# Patient Record
Sex: FEMALE | Race: WHITE | HISPANIC OR LATINO | URBAN - METROPOLITAN AREA
[De-identification: names, ages, dates, MRNs, and addresses within clinical notes are randomized per-mention and may not be internally consistent; named-entity substitution may affect disease eponyms.]

---

## 2017-07-21 ENCOUNTER — APPOINTMENT (RX ONLY)
Dept: URBAN - METROPOLITAN AREA CLINIC 156 | Facility: CLINIC | Age: 27
Setting detail: DERMATOLOGY
End: 2017-07-21

## 2017-07-21 DIAGNOSIS — Z41.9 ENCOUNTER FOR PROCEDURE FOR PURPOSES OTHER THAN REMEDYING HEALTH STATE, UNSPECIFIED: ICD-10-CM

## 2017-07-21 PROCEDURE — ? COSMETIC CONSULTATION: SKIN CARE PRODUCTS AND SERVICES

## 2017-08-07 ENCOUNTER — APPOINTMENT (RX ONLY)
Dept: URBAN - METROPOLITAN AREA CLINIC 156 | Facility: CLINIC | Age: 27
Setting detail: DERMATOLOGY
End: 2017-08-07

## 2017-08-07 DIAGNOSIS — Z41.9 ENCOUNTER FOR PROCEDURE FOR PURPOSES OTHER THAN REMEDYING HEALTH STATE, UNSPECIFIED: ICD-10-CM

## 2017-08-07 PROCEDURE — ? COSMETIC CONSULTATION: LASER RESURFACING

## 2017-08-07 PROCEDURE — ? COSMETIC CONSULTATION: SKIN CARE PRODUCTS AND SERVICES

## 2017-08-07 PROCEDURE — ? COSMETIC CONSULTATION - MICRO-NEEDLING

## 2017-08-07 PROCEDURE — ? COSMETIC CONSULTATION: SKIN TIGHTENING

## 2017-08-07 PROCEDURE — ? COSMETIC CONSULTATION: PALOMAR LASER

## 2017-08-10 ENCOUNTER — RX ONLY (OUTPATIENT)
Age: 27
Setting detail: RX ONLY
End: 2017-08-10

## 2017-08-10 RX ORDER — MINOCYCLINE HYDROCHLORIDE 100 MG/1
TABLET ORAL
Qty: 28 | Refills: 0 | Status: CANCELLED
Stop reason: ENTERED-IN-ERROR

## 2017-08-10 RX ORDER — ACYCLOVIR 400 MG/1
TABLET ORAL
Qty: 14 | Refills: 0 | Status: CANCELLED
Stop reason: ENTERED-IN-ERROR

## 2017-08-16 ENCOUNTER — RX ONLY (OUTPATIENT)
Age: 27
Setting detail: RX ONLY
End: 2017-08-16

## 2017-08-16 RX ORDER — CLINDAMYCIN PHOSPHATE AND TRETINOIN 12; .25 MG/G; MG/G
GEL TOPICAL
Qty: 1 | Refills: 6 | Status: ERX | COMMUNITY
Start: 2017-08-16

## 2017-08-16 RX ORDER — DAPSONE 75 MG/G
GEL TOPICAL
Qty: 1 | Refills: 4 | Status: ERX | COMMUNITY
Start: 2017-08-16

## 2017-10-19 ENCOUNTER — RX ONLY (OUTPATIENT)
Age: 27
Setting detail: RX ONLY
End: 2017-10-19

## 2017-10-19 RX ORDER — HYDROQUINONE 4 %
CREAM (GRAM) TOPICAL
Qty: 1 | Refills: 0 | Status: ERX | COMMUNITY
Start: 2017-10-19

## 2017-10-19 RX ORDER — MINOCYCLINE HYDROCHLORIDE 100 MG/1
TABLET ORAL
Qty: 28 | Refills: 0 | Status: ERX | COMMUNITY
Start: 2017-10-19

## 2017-10-19 RX ORDER — ACYCLOVIR 400 MG/1
TABLET ORAL
Qty: 14 | Refills: 0 | Status: ERX | COMMUNITY
Start: 2017-10-19

## 2017-11-03 ENCOUNTER — RX ONLY (OUTPATIENT)
Age: 27
Setting detail: RX ONLY
End: 2017-11-03

## 2017-11-03 ENCOUNTER — APPOINTMENT (RX ONLY)
Dept: URBAN - METROPOLITAN AREA CLINIC 156 | Facility: CLINIC | Age: 27
Setting detail: DERMATOLOGY
End: 2017-11-03

## 2017-11-03 DIAGNOSIS — Z41.9 ENCOUNTER FOR PROCEDURE FOR PURPOSES OTHER THAN REMEDYING HEALTH STATE, UNSPECIFIED: ICD-10-CM

## 2017-11-03 PROCEDURE — ? COSMETIC CONSULTATION: SKIN CARE PRODUCTS AND SERVICES

## 2017-11-03 RX ORDER — MINOCYCLINE HYDROCHLORIDE 100 MG/1
1 CAPSULE ORAL BID
Qty: 60 | Refills: 0 | Status: ERX | COMMUNITY
Start: 2017-11-03

## 2017-11-03 RX ORDER — NORGESTIMATE AND ETHINYL ESTRADIOL 7DAYSX3 LO
1 KIT ORAL DAILY
Qty: 1 | Refills: 12 | Status: ERX | COMMUNITY
Start: 2017-11-03

## 2017-11-17 ENCOUNTER — APPOINTMENT (RX ONLY)
Dept: URBAN - METROPOLITAN AREA CLINIC 156 | Facility: CLINIC | Age: 27
Setting detail: DERMATOLOGY
End: 2017-11-17

## 2017-11-17 DIAGNOSIS — Z41.9 ENCOUNTER FOR PROCEDURE FOR PURPOSES OTHER THAN REMEDYING HEALTH STATE, UNSPECIFIED: ICD-10-CM

## 2017-11-17 PROCEDURE — ? FRACTIONAL ERBIUM ABLATIVE LASER

## 2017-11-17 PROCEDURE — ? FRAXEL

## 2017-11-17 ASSESSMENT — LOCATION SIMPLE DESCRIPTION DERM
LOCATION SIMPLE: LEFT CHEEK
LOCATION SIMPLE: RIGHT CHEEK

## 2017-11-17 ASSESSMENT — LOCATION DETAILED DESCRIPTION DERM
LOCATION DETAILED: LEFT INFERIOR CENTRAL MALAR CHEEK
LOCATION DETAILED: RIGHT INFERIOR CENTRAL MALAR CHEEK

## 2017-11-17 ASSESSMENT — LOCATION ZONE DERM: LOCATION ZONE: FACE

## 2017-11-17 NOTE — PROCEDURE: FRAXEL
Post-Care Instructions: Treatment area washed with gentle cleanser after treatment.  SPF 30 applied post treatment.  Cool gel pack provided to patient. \\nI reviewed with the patient in detail post-care instructions. Patient should avoid sun until area fully healed.
Treatment Level: 0
Energy(Mj/Cm2): 70
Treatment Number: 1
Length Of Topical Anesthesia Application (Optional): 90 minutes
Add Post-Care Below To The Note: No
External Cooling Fan Speed: 5
Indication: resurfacing
Location: cheeks
Topical Anesthesia Type: 23% Lidocaine 7% Tetracaine
Treatment Level: 3
Consent: Written consent obtained, risks reviewed including but not limited to pain and incomplete improvement.\\n\\nPatient has not used any retinoids or glycolic acid in last 2 weeks, has not used a depilatory cream or wad in 2 weeks, has not used Accutane in last 6 months.  \\n\\nLaser Check pre-procedure:\\nOptical window on laser hand piece clean with no scratches, burns, pits or debris.\\nOptical window on treatment tip clean with no scratches, pits, or debris.
Number Of Passes: 12
Wavelength: 1550nm
External Cooling: Lorna Cryo 5
Detail Level: Simple

## 2017-11-17 NOTE — PROCEDURE: FRACTIONAL ERBIUM ABLATIVE LASER
Spot Size: blue tip
Spot Size: groove tip
Treatment Site: cheeks, temple
External Cooling Fan Speed: 5
Post-Procedure Text: Following the treatment, ice, aquaphor, and sunblock was applied to the treatment areas.  Post-care instructions were discussed.
Consent: Written consent obtained, risks reviewed including but not limited to crusting, scabbing, blistering, scarring, darker or lighter pigmentary change, and/or incomplete removal.
Treatment Site: Cheeks
Detail Level: Zone
Procedural Text: The treatment areas where then treated as noted above.
Post-Care Instructions: I reviewed with the patient in detail post-care instructions. Patient is to apply vaseline with a q-tip to all crusted areas, and avoid picking at any scabs. Pt should stay away from the sun and wear sun protection until fully healed.
Energy In Mj/Cm2: 5/0/0 - 5.5/3/5.5
Number Of Passes: 4
Energy In Mj/Cm2: 9/0/0 9/3/5
Pre-Procedure Text: After consent was obtained and the procedure was explained, all persons present in the exam room put on their protective eyewear. Cold gel was applied to the treatment areas.

## 2017-12-22 ENCOUNTER — RX ONLY (OUTPATIENT)
Age: 27
Setting detail: RX ONLY
End: 2017-12-22

## 2017-12-22 RX ORDER — NORGESTIMATE AND ETHINYL ESTRADIOL 7DAYSX3 LO
KIT ORAL DAILY
Qty: 3 | Refills: 3 | Status: ERX

## 2018-01-05 ENCOUNTER — APPOINTMENT (RX ONLY)
Dept: URBAN - METROPOLITAN AREA CLINIC 156 | Facility: CLINIC | Age: 28
Setting detail: DERMATOLOGY
End: 2018-01-05

## 2018-01-05 DIAGNOSIS — Z41.9 ENCOUNTER FOR PROCEDURE FOR PURPOSES OTHER THAN REMEDYING HEALTH STATE, UNSPECIFIED: ICD-10-CM

## 2018-01-05 PROCEDURE — ? FILLERS

## 2018-01-05 NOTE — PROCEDURE: FILLERS
Decollete Filler  Volume In Cc: 0
Include Cannula Information In Note?: No
Filler: Bellafill
Consent: Written consent obtained. Risks include but not limited to bruising, beading, irregular texture, ulceration, infection, allergic reaction, scar formation, incomplete augmentation, temporary nature, procedural pain.
Post-Care Instructions: Patient instructed to apply ice to reduce swelling.
Lot #: [id number]
Expiration Date (Month Year): 08/19
Map Statment: See Attach Map for Complete Details
Detail Level: Simple
Cheeks Filler Volume In Cc: 2
Topical Anesthesia?: 23% lidocaine, 7% tetracaine

## 2018-02-09 ENCOUNTER — APPOINTMENT (RX ONLY)
Dept: URBAN - METROPOLITAN AREA CLINIC 156 | Facility: CLINIC | Age: 28
Setting detail: DERMATOLOGY
End: 2018-02-09

## 2018-03-22 ENCOUNTER — RX ONLY (OUTPATIENT)
Age: 28
Setting detail: RX ONLY
End: 2018-03-22

## 2018-03-22 RX ORDER — MINOCYCLINE HYDROCHLORIDE 100 MG/1
CAPSULE ORAL
Qty: 28 | Refills: 0 | Status: ERX

## 2018-03-22 RX ORDER — ACYCLOVIR 400 MG/1
TABLET ORAL
Qty: 14 | Refills: 0 | Status: ERX

## 2018-03-23 ENCOUNTER — APPOINTMENT (RX ONLY)
Dept: URBAN - METROPOLITAN AREA CLINIC 156 | Facility: CLINIC | Age: 28
Setting detail: DERMATOLOGY
End: 2018-03-23

## 2018-03-23 DIAGNOSIS — Z41.9 ENCOUNTER FOR PROCEDURE FOR PURPOSES OTHER THAN REMEDYING HEALTH STATE, UNSPECIFIED: ICD-10-CM

## 2018-03-23 PROCEDURE — ? FRAXEL

## 2018-03-23 PROCEDURE — ? FRACTIONAL ERBIUM ABLATIVE LASER

## 2018-03-23 ASSESSMENT — LOCATION DETAILED DESCRIPTION DERM
LOCATION DETAILED: LEFT INFERIOR MEDIAL MALAR CHEEK
LOCATION DETAILED: RIGHT MEDIAL MALAR CHEEK

## 2018-03-23 ASSESSMENT — LOCATION SIMPLE DESCRIPTION DERM
LOCATION SIMPLE: RIGHT CHEEK
LOCATION SIMPLE: LEFT CHEEK

## 2018-03-23 ASSESSMENT — LOCATION ZONE DERM: LOCATION ZONE: FACE

## 2018-03-23 NOTE — PROCEDURE: FRAXEL
Number Of Passes: 4
Length Of Topical Anesthesia Application (Optional): 90 minutes
Treatment Level: 0
Location: cheeks
Anesthesia Type: 1% lidocaine with epinephrine
Treatment Level: 5
Indication: resurfacing
Add Post-Care Below To The Note: No
Detail Level: Simple
Energy(Mj/Cm2): 45
Topical Anesthesia Type: 23% Lidocaine 7% Tetracaine
Post-Care Instructions: Treatment area washed with gentle cleanser after treatment.  SPF 30 applied post treatment.  Cool gel pack provided to patient. \\nI reviewed with the patient in detail post-care instructions. Patient should avoid sun until area fully healed.
External Cooling: Lorna Cryo 5
Anesthesia Volume In Cc: 1
Consent: Written consent obtained, risks reviewed including but not limited to pain and incomplete improvement.\\n\\nPatient has not used any retinoids or glycolic acid in last 2 weeks, has not used a depilatory cream or wad in 2 weeks, has not used Accutane in last 6 months.  \\n\\nLaser Check pre-procedure:\\nOptical window on laser hand piece clean with no scratches, burns, pits or debris.\\nOptical window on treatment tip clean with no scratches, pits, or debris.
Treatment Level: 8
Location: full face
Wavelength: 1550nm
Wavelength: 1927nm
Energy(Mj/Cm2): 10

## 2018-03-23 NOTE — PROCEDURE: FRACTIONAL ERBIUM ABLATIVE LASER
Post-Care Instructions: I reviewed with the patient in detail post-care instructions. Patient is to apply vaseline with a q-tip to all crusted areas, and avoid picking at any scabs. Pt should stay away from the sun and wear sun protection until fully healed.
Spot Size: 4
Energy In Mj/Cm2: 5/0/0-5.5/3/5.5
Treatment Site: Cheeks
Consent: Written consent obtained, risks reviewed including but not limited to crusting, scabbing, blistering, scarring, darker or lighter pigmentary change, and/or incomplete removal.
Spot Size: blue tip
Energy In Mj/Cm2: 9/0/0 9/3/5
Detail Level: Zone
External Cooling Fan Speed: 5
Pre-Procedure Text: After consent was obtained and the procedure was explained, all persons present in the exam room put on their protective eyewear. Cold gel was applied to the treatment areas.
Post-Procedure Text: Following the treatment, ice, aquaphor, and sunblock was applied to the treatment areas.  Post-care instructions were discussed.
Procedural Text: The treatment areas where then treated as noted above.
Spot Size: groove tip

## 2018-09-11 ENCOUNTER — RX ONLY (OUTPATIENT)
Age: 28
Setting detail: RX ONLY
End: 2018-09-11

## 2018-09-11 RX ORDER — DAPSONE 75 MG/G
GEL TOPICAL
Qty: 1 | Refills: 4 | Status: ERX

## 2022-06-10 ENCOUNTER — APPOINTMENT (RX ONLY)
Dept: URBAN - METROPOLITAN AREA CLINIC 154 | Facility: CLINIC | Age: 32
Setting detail: DERMATOLOGY
End: 2022-06-10

## 2022-06-10 DIAGNOSIS — Z41.9 ENCOUNTER FOR PROCEDURE FOR PURPOSES OTHER THAN REMEDYING HEALTH STATE, UNSPECIFIED: ICD-10-CM

## 2022-06-10 PROCEDURE — ? COSMETIC CONSULTATION: SKIN CARE PRODUCTS AND SERVICES

## 2022-06-10 ASSESSMENT — LOCATION SIMPLE DESCRIPTION DERM
LOCATION SIMPLE: LEFT CHEEK
LOCATION SIMPLE: RIGHT CHEEK

## 2022-06-10 ASSESSMENT — LOCATION ZONE DERM: LOCATION ZONE: FACE

## 2022-06-10 ASSESSMENT — LOCATION DETAILED DESCRIPTION DERM
LOCATION DETAILED: LEFT INFERIOR CENTRAL MALAR CHEEK
LOCATION DETAILED: RIGHT CENTRAL MALAR CHEEK

## 2022-09-09 ENCOUNTER — APPOINTMENT (RX ONLY)
Dept: URBAN - METROPOLITAN AREA CLINIC 154 | Facility: CLINIC | Age: 32
Setting detail: DERMATOLOGY
End: 2022-09-09

## 2022-09-09 DIAGNOSIS — Z41.9 ENCOUNTER FOR PROCEDURE FOR PURPOSES OTHER THAN REMEDYING HEALTH STATE, UNSPECIFIED: ICD-10-CM

## 2022-09-09 PROCEDURE — ? LASER HAIR REMOVAL

## 2022-09-09 NOTE — PROCEDURE: LASER HAIR REMOVAL
Render Post-Care In The Note: No
Fluence (Will Not Render If 0): 20
Cooling: DCD 40/30
Spot Size: 1.5 mm
Number Of Prepaid Treatments (Will Not Render If 0): 0
Eye Shield Text: Given the treatment area eye shields were inserted prior to treatment.
Pulse Duration (Include Units): 3
Fluence (Will Not Render If 0): 12
Pre-Procedure: Prior to proceeding the treatment areas were cleaned and all present put on their eye protection.
Cooling: DCD setting
Spot Size: 18 mm
Consent: Written consent obtained, risks reviewed including but not limited to crusting, scabbing, blistering, scarring, darker or lighter pigmentary change, paradoxical hair regrowth, incomplete removal of hair and infection.
Total Pulses: 48
Fluence (Will Not Render If 0): 20
Post-Procedure Care: Immediate endpoint: perifollicular erythema and edema. Laser Gel or Extreme Protect were applied post laser. Post care reviewed with patient.
Cooling: DCD 30/20
Total Pulses: 67
Detail Level: Generalized
Post-Care Instructions: I reviewed with the patient in detail post-care instructions. Patient should avoid sun for a minimum of 4 weeks before and after treatment.
Laser Type: Alexandrite 755nm
Tolerated Procedure (Optional): Tolerated Well
Device Serial Number (Optional): [id number]

## 2022-11-11 ENCOUNTER — APPOINTMENT (RX ONLY)
Dept: URBAN - METROPOLITAN AREA CLINIC 154 | Facility: CLINIC | Age: 32
Setting detail: DERMATOLOGY
End: 2022-11-11

## 2022-11-11 DIAGNOSIS — Z41.9 ENCOUNTER FOR PROCEDURE FOR PURPOSES OTHER THAN REMEDYING HEALTH STATE, UNSPECIFIED: ICD-10-CM

## 2022-11-11 PROCEDURE — ? LASER HAIR REMOVAL

## 2022-11-11 NOTE — PROCEDURE: LASER HAIR REMOVAL
Number Of Prepaid Treatments (Will Not Render If 0): 0
Total Pulses: 67
Topical Anesthesia Type: 10% benzocaine, 3% lidocaine, 2% tetracaine, 0.01% phenylephrine
Spot Size: 18 mm
Render Post-Care In The Note: No
Post-Procedure Care: Immediate endpoint: perifollicular erythema and edema. Laser Gel or Extreme Protect were applied post laser. Post care reviewed with patient.
Fluence (Will Not Render If 0): 20
Spot Size: 1.5 mm
Device Serial Number (Optional): [id number]
Laser Type: Alexandrite 755nm
Cooling: DCD 40/30
Pulse Duration (Include Units): 3
Fluence (Will Not Render If 0): 10
Fluence (Will Not Render If 0): 12
Detail Level: Generalized
Consent: Written consent obtained, risks reviewed including but not limited to crusting, scabbing, blistering, scarring, darker or lighter pigmentary change, paradoxical hair regrowth, incomplete removal of hair and infection.
Cooling: DCD setting
Tolerated Procedure (Optional): Tolerated Well
Fluence (Will Not Render If 0): 20
Pre-Procedure: Prior to proceeding the treatment areas were cleaned and all present put on their eye protection.
Eye Shield Text: Given the treatment area eye shields were inserted prior to treatment.
Cooling: DCD 30/20
Post-Care Instructions: I reviewed with the patient in detail post-care instructions. Patient should avoid sun for a minimum of 4 weeks before and after treatment.
Total Pulses: 46